# Patient Record
Sex: FEMALE | Race: WHITE
[De-identification: names, ages, dates, MRNs, and addresses within clinical notes are randomized per-mention and may not be internally consistent; named-entity substitution may affect disease eponyms.]

---

## 2019-03-29 ENCOUNTER — HOSPITAL ENCOUNTER (EMERGENCY)
Dept: HOSPITAL 25 - UCEAST | Age: 49
Discharge: LEFT BEFORE DISCHARGE | End: 2019-03-29
Payer: COMMERCIAL

## 2019-03-29 DIAGNOSIS — Z53.21: Primary | ICD-10-CM

## 2019-03-30 NOTE — UC
Course/Dx





- Diagnoses


Provider Diagnoses: 


 Patient left without being seen








Discharge





- Sign-Out/Discharge


Documenting (check all that apply): Post-Discharge Follow Up


All imaging exams completed and their final reports reviewed: No Studies





- Discharge Plan


Condition: Stable


Disposition: LEFT WITHOUT BEING SEEN


Referrals: 


Freddy Samuel MD [Primary Care Provider] - 





- Billing Disposition and Condition


Condition: STABLE


Disposition: Left Without Being Seen

## 2019-04-05 ENCOUNTER — HOSPITAL ENCOUNTER (EMERGENCY)
Dept: HOSPITAL 25 - UCEAST | Age: 49
Discharge: HOME | End: 2019-04-05
Payer: COMMERCIAL

## 2019-04-05 VITALS — SYSTOLIC BLOOD PRESSURE: 120 MMHG | DIASTOLIC BLOOD PRESSURE: 69 MMHG

## 2019-04-05 DIAGNOSIS — R11.0: ICD-10-CM

## 2019-04-05 DIAGNOSIS — Z87.891: ICD-10-CM

## 2019-04-05 DIAGNOSIS — J32.9: Primary | ICD-10-CM

## 2019-04-05 DIAGNOSIS — R68.83: ICD-10-CM

## 2019-04-05 PROCEDURE — G0463 HOSPITAL OUTPT CLINIC VISIT: HCPCS

## 2019-04-05 PROCEDURE — 99212 OFFICE O/P EST SF 10 MIN: CPT

## 2019-04-05 NOTE — UC
Respiratory Complaint HPI





- HPI Summary


HPI Summary: 


1 WEEK OF SINUS PRESSURE, NASAL CONGESTION AND MILD COUGH.  HAD FEVER INITIALLY 

BUT NONE NOW.  THOUGHT SHE WAS GETTING BETTER BUT THEN YESTERDAY STARTED 

FEELING WORSE AGAIN.  DEVELOPED HEADACHE AND SOME MILD PHOTOPHOBIA.  DOES HAVE 

A HISTORY OF MIGRAINES BUT STATES THIS FEELS SLIGHTLY DIFFERENT.  SHE BELIEVES 

IT IS MORE RELATED TO HER SINUSES.





- History of Current Complaint


Chief Complaint: DANIELLAeadamariano


Stated Complaint: HEADACHE SINUS ISSUE


Time Seen by Provider: 04/05/19 08:15


Hx Obtained From: Patient


Onset/Duration: Gradual Onset, Lasting Days, Still Present


Timing: Constant


Severity Initially: Moderate


Severity Currently: Moderate


Pain Intensity: 9


Pain Scale Used: 0-10 Numeric


Character: Cough: Nonproductive


Aggravating Factors: Nothing


Alleviating Factors: Nothing


Associated Signs And Symptoms: Positive: Fever, Chills, Nasal Congestion, Sinus 

Discomfort





- Allergies/Home Medications


Allergies/Adverse Reactions: 


 Allergies











Allergy/AdvReac Type Severity Reaction Status Date / Time


 


No Known Allergies Allergy   Verified 06/18/15 10:23











Home Medications: 


 Home Medications





Acetaminophen [Tylenol] 650 mg PO ONCE PRN 04/05/19 [History Confirmed 04/05/19]


Diphenhydra/Phenyleph/Acetamin [Cold & Flu Relief Multi-Sym Lq] 1 dose PO ONCE 

PRN 04/05/19 [History Confirmed 04/05/19]


Ibuprofen 800 mg PO ONCE PRN 04/05/19 [History Confirmed 04/05/19]











PMH/Surg Hx/FS Hx/Imm Hx


Endocrine History: Hypothyroidism


Neurological History: Migraine





- Surgical History


Surgical History: Yes


Surgery Procedure, Year, and Place: hysterectomy





- Family History


Known Family History: Positive: Non-Contributory





- Social History


Alcohol Use: Occasionally


Substance Use Type: None


Smoking Status (MU): Former Smoker


Type: Cigarettes


Amount Used/How Often: off and on, some days none, some days a few


Have You Smoked in the Last Year: Yes





Review of Systems


All Other Systems Reviewed And Are Negative: Yes


Constitutional: Positive: Fever, Chills, Fatigue


Eyes: Positive: Photophobia


ENT: Positive: Nasal Discharge, Sinus Congestion, Sinus Pain/Tenderness


Respiratory: Positive: Cough


Cardiovascular: Positive: Negative


Gastrointestinal: Positive: Nausea


Neurological: Positive: Headache





Physical Exam


Triage Information Reviewed: Yes


Appearance: Well-Nourished, Pain Distress - MILD


Vital Signs: 


 Initial Vital Signs











Temp  98.7 F   04/05/19 08:02


 


Pulse  55   04/05/19 08:02


 


Resp  18   04/05/19 08:02


 


BP  120/69   04/05/19 08:02


 


Pulse Ox  100   04/05/19 08:02











Vital Signs Reviewed: Yes


Eyes: Positive: Conjunctiva Clear


ENT: Positive: Hearing grossly normal, Pharynx normal, TMs normal


Neck: Positive: Supple, Nontender, No Lymphadenopathy


Respiratory Exam: Normal


Cardiovascular Exam: Normal


Abdomen Description: Positive: Soft


Musculoskeletal: Positive: No Edema


Neurological: Positive: Alert


Psychological: Positive: Age Appropriate Behavior


Skin: Negative: Rashes





Respiratory Course/Dx





- Course


Course Of Treatment: 





DISCUSSED WITH PATIENT POSSIBILITY THAT HEADACHE IS UNRELATED TO HER SINUS/

RESPIRATORY INFECTION.  PATIENT STATES THAT SHE BELIEVES HER HEADACHE IS SINUS 

RELATED AND WOULD LIKE TO TRY TREATMENT FOR THAT BEFORE ANY TARGETED HEADACHE 

THERAPY.  ADVISED TO FOLLOW-UP IF SHE IS NOT IMPROVING AFTER ANTIBIOTIC TX.





- Differential Dx/Diagnosis


Provider Diagnosis: 


 Sinusitis








Discharge





- Sign-Out/Discharge


Documenting (check all that apply): Patient Departure


All imaging exams completed and their final reports reviewed: No Studies





- Discharge Plan


Condition: Stable


Disposition: HOME


Prescriptions: 


Amoxicillin/Clavulanate TAB* [Augmentin *] 875 mg PO BID #20 tab


Patient Education Materials:  Sinusitis (ED)


Referrals: 


Freddy Samuel MD [Primary Care Provider] - If Needed


Additional Instructions: 


TAKE THE ANTIBIOTIC FOR THE FULL 10 DAYS TO COVER FOR SINUSITIS.  REST, HYDRATE

, OTC MEDS AS NEEDED FOR HEADACHE AND DISCOMFORT.  SEEK FOLLOW-UP HERE OR WITH 

YOUR PCP IF YOU'RE NOT IMPROVING AS EXPECTED.





- Billing Disposition and Condition


Condition: STABLE


Disposition: Home